# Patient Record
Sex: FEMALE | Race: WHITE
[De-identification: names, ages, dates, MRNs, and addresses within clinical notes are randomized per-mention and may not be internally consistent; named-entity substitution may affect disease eponyms.]

---

## 2019-02-27 ENCOUNTER — HOSPITAL ENCOUNTER (OUTPATIENT)
Dept: HOSPITAL 61 - PCVCIMAG | Age: 74
Discharge: HOME | End: 2019-02-27
Attending: PODIATRIST
Payer: MEDICARE

## 2019-02-27 DIAGNOSIS — M79.89: ICD-10-CM

## 2019-02-27 DIAGNOSIS — I82.401: Primary | ICD-10-CM

## 2019-02-27 PROCEDURE — 93971 EXTREMITY STUDY: CPT

## 2019-02-27 NOTE — PCVCIMAG
EXAM: VENOUS DUPLEX RIGHT LEG



INDICATION: Leg pain and swelling.



FINDINGS: 

Right leg: No thrombus in the common femoral, main femoral, or

popliteal veins. These veins are compressible with phasic flow. Calf

veins are unremarkable where seen.



IMPRESSION: 

No evidence of deep venous thrombosis in the right lower extremity as

detailed above.



LOC:[id number]